# Patient Record
Sex: MALE | Race: WHITE
[De-identification: names, ages, dates, MRNs, and addresses within clinical notes are randomized per-mention and may not be internally consistent; named-entity substitution may affect disease eponyms.]

---

## 2023-03-02 ENCOUNTER — HOSPITAL ENCOUNTER (OUTPATIENT)
Dept: HOSPITAL 75 - RAD | Age: 48
End: 2023-03-02
Attending: FAMILY MEDICINE
Payer: SELF-PAY

## 2023-03-02 DIAGNOSIS — Z82.49: Primary | ICD-10-CM

## 2023-03-02 PROCEDURE — 75571 CT HRT W/O DYE W/CA TEST: CPT

## 2023-03-02 NOTE — DIAGNOSTIC IMAGING REPORT
INDICATION: Family history of coronary artery disease



CT coronary calcium scoring study performed with noncontrast

images of the heart and calculation of cardiac score. Dose

reduction protocol was used.



Raw data images demonstrate no overt mediastinal or hilar

adenopathy. Visualized portions of the aorta were unremarkable.

The entirety of the lungs are not included on the study but

visualized portions of the lung fields show no infiltrates or

nodules.



There was no significant coronary artery calcification. Coronary

calcium score was 0



IMPRESSION:

No significant coronary recalcification, coronary calcium score

was 0.



Dictated by: 



  Dictated on workstation # [id number]